# Patient Record
Sex: FEMALE | Race: WHITE | NOT HISPANIC OR LATINO | URBAN - METROPOLITAN AREA
[De-identification: names, ages, dates, MRNs, and addresses within clinical notes are randomized per-mention and may not be internally consistent; named-entity substitution may affect disease eponyms.]

---
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
## 2020-04-01 ENCOUNTER — OFFICE VISIT - RIVER FALLS (OUTPATIENT)
Dept: FAMILY MEDICINE | Facility: CLINIC | Age: 20
End: 2020-04-01

## 2020-04-20 ENCOUNTER — OFFICE VISIT - RIVER FALLS (OUTPATIENT)
Dept: FAMILY MEDICINE | Facility: CLINIC | Age: 20
End: 2020-04-20

## 2020-05-07 ENCOUNTER — OFFICE VISIT - RIVER FALLS (OUTPATIENT)
Dept: FAMILY MEDICINE | Facility: CLINIC | Age: 20
End: 2020-05-07

## 2022-02-12 VITALS
WEIGHT: 156 LBS | HEART RATE: 68 BPM | BODY MASS INDEX: 26.63 KG/M2 | HEIGHT: 64 IN | SYSTOLIC BLOOD PRESSURE: 126 MMHG | DIASTOLIC BLOOD PRESSURE: 84 MMHG

## 2022-02-12 VITALS
WEIGHT: 157.6 LBS | BODY MASS INDEX: 26.91 KG/M2 | DIASTOLIC BLOOD PRESSURE: 84 MMHG | HEART RATE: 88 BPM | HEIGHT: 64 IN | SYSTOLIC BLOOD PRESSURE: 122 MMHG

## 2022-02-12 VITALS
HEIGHT: 64 IN | HEART RATE: 76 BPM | DIASTOLIC BLOOD PRESSURE: 70 MMHG | BODY MASS INDEX: 24.28 KG/M2 | SYSTOLIC BLOOD PRESSURE: 124 MMHG | WEIGHT: 142.2 LBS | TEMPERATURE: 98.6 F

## 2022-02-15 NOTE — NURSING NOTE
Comprehensive Intake Entered On:  5/7/2020 9:42 AM CDT    Performed On:  5/7/2020 9:39 AM CDT by Hallie Nichols LPN               Summary   Chief Complaint :   Verbal consent given for telemed appt. ISHMAEL follow up.    Menstrual Status :   Menarcheal   Hallie Nichols LPN - 5/7/2020 9:39 AM CDT   Health Status   Allergies Verified? :   Yes   Medication History Verified? :   Yes   Hallie Nichols LPN - 5/7/2020 9:39 AM CDT   Consents   Consent for Immunization Exchange :   Consent Granted   Consent for Immunizations to Providers :   Consent Granted   Hallie Nichols LPN - 5/7/2020 9:39 AM CDT   Meds / Allergies   (As Of: 5/7/2020 9:42:41 AM CDT)   Allergies (Active)   No known allergies  Estimated Onset Date:   Unspecified ; Created By:   Shantel Tirado CMA; Reaction Status:   Active ; Category:   Drug ; Substance:   No known allergies ; Type:   Allergy ; Updated By:   Shantel Tirado CMA; Reviewed Date:   4/1/2020 1:41 PM CDT      No Known Medication Allergies  Estimated Onset Date:   Unspecified ; Created By:   Shantel Tirado CMA; Reaction Status:   Active ; Category:   Drug ; Substance:   No Known Medication Allergies ; Type:   Allergy ; Updated By:   Shantel Tirado CMA; Reviewed Date:   4/1/2020 1:41 PM CDT        Medication List   (As Of: 5/7/2020 9:42:41 AM CDT)   Prescription/Discharge Order    ethinyl estradiol-norethindrone  :   ethinyl estradiol-norethindrone ; Status:   Prescribed ; Ordered As Mnemonic:   Ted FE 1.5/30 oral tablet ; Simple Display Line:   1 tab(s), po, daily, 84 tab(s), 3 Refill(s) ; Ordering Provider:   Erich Jaquez MD; Catalog Code:   ethinyl estradiol-norethindrone ; Order Dt/Tm:   4/20/2020 1:37:55 PM CDT          sertraline  :   sertraline ; Status:   Processing ; Ordered As Mnemonic:   sertraline 50 mg oral tablet ; Simple Display Line:   50 mg, 1 tab(s), Oral, daily, 30 tab(s), 5 Refill(s) ; Ordering Provider:   Erich Jaquez MD; Action Display:   Modify ;  Catalog Code:   sertraline ; Order Dt/Tm:   5/7/2020 9:39:59 AM CDT            ID Risk Screen   Recent Travel History :   No recent travel   Family Member Travel History :   No recent travel   Other Exposure to Infectious Disease :   Unknown   Hallie Nichols LPN - 5/7/2020 9:39 AM CDT

## 2022-02-15 NOTE — NURSING NOTE
CAGE Assessment Entered On:  10/18/2019 10:17 AM CDT    Performed On:  10/16/2019 10:11 AM CDT by Tati Allen               Assessment   Have you ever felt you should cut down on your drinking :   No   Have people annoyed you by criticizing your drinking :   No   Have you ever felt bad or guilty about your drinking :   No   Have you ever taken a drink first thing in the morning to steady your nerves or get rid of a hangover (Eye-opener) :   No   CAGE Score :   0    Tati Allen - 10/18/2019 10:11 AM CDT

## 2022-02-15 NOTE — NURSING NOTE
Comprehensive Intake Entered On:  1/24/2019 1:42 PM CST    Performed On:  1/24/2019 1:30 PM CST by Shantel Tirado CMA               Summary   Chief Complaint :   Refill ocp.    Last Menstrual Period :   12/21/2018 CST   Menstrual Status :   Menarcheal   Weight Measured :   156 lb(Converted to: 156 lb 0 oz, 70.76 kg)    Height Measured :   64 in(Converted to: 5 ft 4 in, 162.56 cm)    Body Mass Index :   26.77 kg/m2   Body Surface Area :   1.79 m2   Systolic Blood Pressure :   126 mmHg   Diastolic Blood Pressure :   84 mmHg (HI)    Mean Arterial Pressure :   98 mmHg   Peripheral Pulse Rate :   68 bpm   BP Site :   Right arm   Pulse Site :   Radial artery   BP Method :   Manual   HR Method :   Manual   Shantel Tirado CMA - 1/24/2019 1:30 PM CST   Health Status   Allergies Verified? :   Yes   Medication History Verified? :   Yes   Pre-Visit Planning Status :   Completed   Shantel Tirado CMA - 1/24/2019 1:30 PM CST   Meds / Allergies   (As Of: 1/24/2019 1:42:46 PM CST)   Allergies (Active)   No known allergies  Estimated Onset Date:   Unspecified ; Created By:   Shantel Tirado CMA; Reaction Status:   Active ; Category:   Drug ; Substance:   No known allergies ; Type:   Allergy ; Updated By:   Shantel Tirado CMA; Reviewed Date:   1/24/2019 1:40 PM CST      No Known Medication Allergies  Estimated Onset Date:   Unspecified ; Created By:   Shantel Tirado CMA; Reaction Status:   Active ; Category:   Drug ; Substance:   No Known Medication Allergies ; Type:   Allergy ; Updated By:   Shantel Tirado CMA; Reviewed Date:   12/28/2017 3:31 PM CST        Medication List   (As Of: 1/24/2019 1:42:46 PM CST)   Prescription/Discharge Order    ethinyl estradiol-norethindrone  :   ethinyl estradiol-norethindrone ; Status:   Prescribed ; Ordered As Mnemonic:   Ted FE 1.5/30 oral tablet ; Simple Display Line:   1 tab(s), po, daily, for 28 day(s), 84 tab(s), 4 Refill(s) ; Ordering Provider:   Ariana Mistry MD; Catalog Code:    ethinyl estradiol-norethindrone ; Order Dt/Tm:   12/28/2017 3:42:24 PM

## 2022-02-15 NOTE — NURSING NOTE
Comprehensive Intake Entered On:  4/20/2020 1:24 PM CDT    Performed On:  4/20/2020 1:21 PM CDT by Nneka Dahl MA               Summary   Chief Complaint :   verbal consent given for telemed visit.  discuss resuming BCP, has been off for a few months.   Menstrual Status :   Menarcheal   Nneka Dahl MA - 4/20/2020 1:21 PM CDT   Health Status   Allergies Verified? :   Yes   Medication History Verified? :   Yes   Medical History Verified? :   Yes   Pre-Visit Planning Status :   Not completed   Tobacco Use? :   Never smoker   Nneka Dahl MA - 4/20/2020 1:21 PM CDT   Consents   Consent for Immunization Exchange :   Consent Granted   Consent for Immunizations to Providers :   Consent Granted   Nneka Dahl MA - 4/20/2020 1:21 PM CDT   Meds / Allergies   (As Of: 4/20/2020 1:24:36 PM CDT)   Allergies (Active)   No known allergies  Estimated Onset Date:   Unspecified ; Created By:   Shantel Tirado CMA; Reaction Status:   Active ; Category:   Drug ; Substance:   No known allergies ; Type:   Allergy ; Updated By:   Shantel Tirado CMA; Reviewed Date:   4/1/2020 1:41 PM CDT      No Known Medication Allergies  Estimated Onset Date:   Unspecified ; Created By:   Shantel Tirado CMA; Reaction Status:   Active ; Category:   Drug ; Substance:   No Known Medication Allergies ; Type:   Allergy ; Updated By:   Shantel Tirado CMA; Reviewed Date:   4/1/2020 1:41 PM CDT        Medication List   (As Of: 4/20/2020 1:24:36 PM CDT)   Prescription/Discharge Order    sertraline  :   sertraline ; Status:   Prescribed ; Ordered As Mnemonic:   sertraline 50 mg oral tablet ; Simple Display Line:   50 mg, 1 tab(s), Oral, daily, Take 1/2 tab daily for the first 6 days then increase to 1 tab, 30 tab(s), 5 Refill(s) ; Ordering Provider:   Erich Jaquez MD; Catalog Code:   sertraline ; Order Dt/Tm:   4/1/2020 1:52:42 PM CDT            Social History   Social History   (As Of: 4/20/2020 1:24:36 PM CDT)   Tobacco:        Household  tobacco concerns: Yes.   (Last Updated: 1/3/2018 9:03:22 AM CST by Aida Jama)          Home/Environment:        Lives with Mother.  Risks in environment: Gun in the home..   (Last Updated: 11/4/2016 1:24:25 PM CDT by Soraya Lowry)

## 2022-02-15 NOTE — NURSING NOTE
Generalized Anxiety Disorder Screening Entered On:  5/7/2020 9:43 AM CDT    Performed On:  5/7/2020 9:42 AM CDT by Hallie Nichols LPN               Generalized Anxiety Disorder Screening   ISHMAEL Nervous, Anxious On Edge :   Several days   ISHMAEL Control Worrying B :   Several days   ISHMAEL Worrying Too Much :   Several days   ISHMAEL Trouble Relaxing :   More than half the days   ISHMAEL Restless :   More than half the days   ISHMAEL Easily Annoyed/Irritable :   Several days   ISHMAEL Afraid :   Several days   ISHMAEL Total Screening Score :   9    ISHMAEL Difficulty with Work, Home, Others :   Somewhat difficult   Hallie Nichols LPN - 5/7/2020 9:42 AM CDT

## 2022-02-15 NOTE — PROGRESS NOTES
Chief Complaint    verbal consent given for telemed visit.  discuss resuming BCP, has been off for a few months.   Today's visit was conducted via telephone due to the COVID-19 pandemic.  Patient's consent to telephone visit was obtained and documented.   Call Start Time: 1335   Call End Time:   1340  History of Present Illness      Patient would like to resume oral contraceptives.  She has been on ethinyl estradiol norethindrone in the past without any troubles.  Her last menstrual period was last week.  She is not currently sexually active.  Her blood pressure has been normal in the past.  Review of Systems      See HPI.  All other review of systems negative.  Assessment/Plan       1. Contraceptive management (Z30.41)        Resume oral birth control.  3-month supply is been sent in.  Discussed starting of the medication.  Also discussed use of condoms for prevention of sexually transmitted infection        Orders:         ethinyl estradiol-norethindrone, 1 tab(s), po, daily, # 84 tab(s), 3 Refill(s), Type: Maintenance, Pharmacy: [facility] #[id number], 1 tab(s) Oral daily, (Ordered)  Patient Information     Name:SUSSY MORENO      Address:      [address] [place] [id number]     Sex:Female     YOB: 2000     [phone]     Emergency Contact:YAYO CASTELLANO     MRN:[id number]     FIN:[id number]     Location:[facility]     Date of Service:04/20/2020      Primary Care Physician:       Erich Jaquez MD, [phone]      Attending Physician:       Erich Jaquez MD, [phone]  Problem List/Past Medical History    Ongoing     Anxiety    Historical     No qualifying data  Medications    Ted FE 1.5/30 oral tablet, 1 tab(s), Oral, daily, 3 refills    sertraline 50 mg oral tablet, 50 mg= 1 tab(s), Oral, daily, 5 refills  Allergies    No Known Medication Allergies    No known allergies  Social History    Smoking Status - 04/20/2020     Never smoker      Home/Environment      Lives with Mother. Risks in environment: Gun in the home.., 11/04/2016     Tobacco      Household tobacco concerns: Yes., 01/03/2018  Family History    Arthritis: Grandparent.    Diabetes mellitus: Grandparent.    Hypertension: Grandfather (M).    Tobacco user: Father.    Mother: History is negative  Immunizations      Vaccine Date Status          varicella 03/27/2012 Recorded          meningococcal conjugate vaccine 03/27/2012 Recorded          tetanus/diphth/pertuss (Tdap) adult/adol 03/27/2012 Recorded          human papillomavirus vaccine 03/27/2012 Recorded          DTaP 09/07/2005 Recorded          tetanus/diphth/pertuss (Tdap) adult/adol 09/07/2005 Recorded          MMR (measles/mumps/rubella) 09/07/2005 Recorded          IPV 09/07/2005 Recorded          varicella 09/21/2001 Recorded          DTaP 09/21/2001 Recorded          pneumococcal (PCV7) 09/21/2001 Recorded          IPV 09/21/2001 Recorded          varicella 06/22/2001 Recorded          MMR (measles/mumps/rubella) 06/22/2001 Recorded          Hib (HbOC) 06/22/2001 Recorded          Hib (HbOC) 03/27/2001 Recorded          DTaP 2000 Recorded          hepatitis B pediatric vaccine 2000 Recorded          Hib (HbOC) 2000 Recorded          DTaP 2000 Recorded          hepatitis B pediatric vaccine 2000 Recorded          IPV 2000 Recorded          Hib (HbOC) 2000 Recorded          DTaP 2000 Recorded          hepatitis B pediatric vaccine 2000 Recorded          IPV 2000 Recorded          Hib (HbOC) 2000 Recorded

## 2022-02-15 NOTE — PROGRESS NOTES
Chief Complaint    Verbal consent given for telemed appt. ISHMAEL follow up.   Today's visit was conducted via telephone due to the COVID-19 pandemic.  Patient's consent to telephone visit was obtained and documented.   Call Start Time: 1001   Call End Time:   1007  History of Present Illness      Patient is a follow-up for generalized anxiety disorder.  She has been on sertraline 50 mg daily for the last month.  She has been doing well.  Denies any side effects from medication.  She continues with her online schooling and is recently started working at [employer].  Overall has been doing well and is satisfied with the medication.  Review of Systems      See HPI.  All other review of systems negative.  Assessment/Plan       Anxiety (F41.1)        Generalized anxiety disorder is under control.  We will continue with her current medication.  Advise follow-up in mid September.          Ordered:          sertraline, = 1 tab(s) ( 50 mg ), Oral, daily, # 30 tab(s), 5 Refill(s), Type: Maintenance, Pharmacy: [facility] #[id number], (Ordered)           Patient Information     Name:SUSSY MORENO      Address:      [address] [place] [id number]     Sex:Female     YOB: 2000     [phone]     Emergency Contact:YAYO CASTELLANO     MRN:[id number]     FIN:[id number]     Location:[facility]     Date of Service:05/07/2020      Primary Care Physician:       Erich Jaquez MD, [phone]      Attending Physician:       Erich Jaquez MD, [phone]  Problem List/Past Medical History    Ongoing     Anxiety    Historical     No qualifying data  Medications    Ted FE 1.5/30 oral tablet, 1 tab(s), Oral, daily, 3 refills    sertraline 50 mg oral tablet, 50 mg= 1 tab(s), Oral, daily, 5 refills  Allergies    No Known Medication Allergies    No known allergies  Social History    Smoking Status - 04/20/2020     Never smoker     Home/Environment      Lives with Mother. Risks  in environment: Gun in the home.., 11/04/2016     Tobacco      Household tobacco concerns: Yes., 01/03/2018  Family History    Arthritis: Grandparent.    Diabetes mellitus: Grandparent.    Hypertension: Grandfather (M).    Tobacco user: Father.    Mother: History is negative  Immunizations      Vaccine Date Status          varicella 03/27/2012 Recorded          meningococcal conjugate vaccine 03/27/2012 Recorded          tetanus/diphth/pertuss (Tdap) adult/adol 03/27/2012 Recorded          human papillomavirus vaccine 03/27/2012 Recorded          DTaP 09/07/2005 Recorded          tetanus/diphth/pertuss (Tdap) adult/adol 09/07/2005 Recorded          MMR (measles/mumps/rubella) 09/07/2005 Recorded          IPV 09/07/2005 Recorded          varicella 09/21/2001 Recorded          DTaP 09/21/2001 Recorded          pneumococcal (PCV7) 09/21/2001 Recorded          IPV 09/21/2001 Recorded          varicella 06/22/2001 Recorded          MMR (measles/mumps/rubella) 06/22/2001 Recorded          Hib (HbOC) 06/22/2001 Recorded          Hib (HbOC) 03/27/2001 Recorded          DTaP 2000 Recorded          hepatitis B pediatric vaccine 2000 Recorded          Hib (HbOC) 2000 Recorded          DTaP 2000 Recorded          hepatitis B pediatric vaccine 2000 Recorded          IPV 2000 Recorded          Hib (HbOC) 2000 Recorded          DTaP 2000 Recorded          hepatitis B pediatric vaccine 2000 Recorded          IPV 2000 Recorded          Hib (HbOC) 2000 Recorded

## 2022-02-15 NOTE — PROGRESS NOTES
Patient:   SUSSY CACERES            MRN: [id number]            FIN: [id number]               Age:   17 years     Sex:  Female     :  2000   Associated Diagnoses:   Contraceptive management; Well child examination   Author:   Ariana Mistry MD      Chief Complaint   2017 3:03 PM CST   17 year old well exam , refill BC      Well Child History   Here today with mom for 17 year well-child exam as well as birth control refill.  Overall no concerns.  Development: Is in 12th grade at [employer].  Plans to join the [employer] when she graduates.  Unsure what she wants to do long-term.  Grades are A s and B s.  Does well socially.  Has been sexually active in the past but not currently.  Did have a chlamydia test through her  physical.  Driving a car.  Denies using her phone while driving.  Does wear her seatbelt.  Does not wear a helmet when she rides a 4 aguayo.  Denies tobacco alcohol and drug use.  Diet: Eats a wide variety of foods.  Does eat breakfast daily.  No concerns about weight.  Sleep: Bed around 8:30 at night.  Falls asleep easily and able to stay asleep.  Does keep her phone in her room for her alarm clock.  Last menstrual period was .  Periods are only 3-4 days on the oral contraceptives.  Thinks the birth control is regulating things well and has no concerns but would like refills.      Review of Systems   Constitutional:  Negative.    Eye:  Negative.    Ear/Nose/Mouth/Throat:  Negative.    Respiratory:  Negative.    Cardiovascular:  Negative.    Gastrointestinal:  Negative.    Genitourinary:  Negative.    Musculoskeletal:  Negative.    Integumentary:  Negative.       Health Status   Allergies:    Allergic Reactions (Selected)  No Known Medication Allergies   Medications:  (Selected)   Prescriptions  Prescribed  Ted FE 1.5/30 oral tablet: 1 tab(s), po, daily, # 28 tab(s), 0 Refill(s), Type: Maintenance, Pharmacy: [facility] #[id number]      Histories   Past Medical  History:    No active or resolved past medical history items have been selected or recorded.   Family History:    Diabetes mellitus  Grandparent  Arthritis  Grandparent     Procedure history:    No active procedure history items have been selected or recorded.   Social History:        Home and Environment Assessment            Lives with Mother.  Risks in environment: Gun in the home..        Physical Examination   Vital Signs   12/28/2017 3:03 PM CST Temperature Tympanic 98.6 DegF    Peripheral Pulse Rate 76 bpm    Pulse Site Radial artery    HR Method Manual    Systolic Blood Pressure 124 mmHg    Diastolic Blood Pressure 70 mmHg    Mean Arterial Pressure 88 mmHg    BP Site Right arm    BP Method Manual      Measurements from flowsheet : Measurements   12/28/2017 3:03 PM CST Height Measured - Metric 162.5 cm    Weight Measured - Metric 64.5 kg    BSA - Metric 1.71 m2    Body Mass Index - Metric 24.43 kg/m2    Body Mass Index Percentile 79.52      General:  Alert and oriented, No acute distress.    Eye:  Pupils are equal, round and reactive to light, Extraocular movements are intact, Undilated funduscopic exam:  Vessels smooth, disc margins not visualized. .    HENT:  Tympanic membranes are clear, Oral mucosa is moist, No pharyngeal erythema.    Neck:  No lymphadenopathy, No thyromegaly.    Respiratory:  Lungs clear to auscultation bilaterally.  Equal air entry.  Symmetrical chest expansion.  No wheezing.  .    Cardiovascular:  S1 and S2 with regular rate and rhythm.  No murmurs. Brisk capillary refill.  .    Gastrointestinal:  Positive bowel sounds in all four quadrants.  Abdomen is soft, non-distended, non-tender.  No hepatosplenomegaly.  .    Genitourinary:  Declines  exam.    Musculoskeletal:  Normal gait.    Integumentary:  Refuses a gown, unable to fully assess.    Neurologic:  No focal deficits, Normal deep tendon reflexes.    Psychiatric:  Cooperative, Appropriate mood & affect.       Impression and Plan    Diagnosis     Contraceptive management ([id number] Z30.41).     Well child examination (CKI13-LF Z00.129).     Plan:  Reinforced importance of helmet use when riding a 4 aguayo in particular.  Discussed condom use and abstinence.  Declines Chlamydia screening as she believes she ready had this done.  Declines Menactra and HPV today.  Encouraged them to ensure they had the Menactra prior to entering the .  Return to clinic in 1 year for follow-up on birth control, sooner if needed.  .    Orders     Orders (Selected)   Prescriptions  Prescribed  Ted FE 1.5/30 oral tablet: 1 tab(s), po, daily, # 84 tab(s), 4 Refill(s), Type: Maintenance, Pharmacy: [facility] #[id number], 1 tab(s) po daily,x28 day(s).

## 2022-02-15 NOTE — TELEPHONE ENCOUNTER
Entered by Cathryn Kirkland CMA on January 22, 2019 10:24:35 AM CST  ---------------------  From: Cathryn Kirkland CMA   To: [facility] #[id number]    Sent: 1/22/2019 10:24:35 AM CST  Subject: Medication Management     ** Not Approved: Refill not appropriate, will refill at upcoming appt - pt has enough to last until then. **  ethinyl estradiol-norethindrone (Ted Fe 1.5/30 Oral Tablet 1.5-30 MG-MCG)  TAKE ONE TABLET BY MOUTH ONE TIME DAILY  Qty:  84 tab(s)        Days Supply:  84        Refills:  3          MARK     Route To Pharmacy - [facility] #[id number]   Signed by Cathryn Kirkland CMA            ------------------------------------------  From: [facility] #[id number]  To: Ariana Mistry MD  Sent: January 21, 2019 5:02:41 PM CST  Subject: Medication Management  Due: January 22, 2019 5:02:41 PM CST    ** On Hold Pending Signature **  Drug: ethinyl estradiol-norethindrone (Ted FE 1.5/30 oral tablet)  TAKE ONE TABLET BY MOUTH ONE TIME DAILY   Quantity: 84 tab(s)     Days Supply: 84        Refills: 3  Substitutions Allowed  Notes from Pharmacy:     Dispensed Drug: ethinyl estradiol-norethindrone (Ted FE 1.5/30 oral tablet)  TAKE ONE TABLET BY MOUTH ONE TIME DAILY   Quantity: 84 tab(s)     Days Supply: 84        Refills: 3  Substitutions Allowed  Notes from Pharmacy:   ------------------------------------------

## 2022-02-15 NOTE — TELEPHONE ENCOUNTER
Entered by Cathryn Kirkland CMA on January 06, 2021 7:19:31 AM CST  ---------------------  From: Cathryn Kirkland CMA   To: [facility] #[id number]    Sent: 1/6/2021 7:19:31 AM CST  Subject: Medication Management     ** Submitted: **  Order:sertraline (sertraline 50 mg oral tablet)  1 tab(s)  Oral  daily  Qty:  30 tab(s)        Refills:  0          Substitutions Allowed     Route To [facility] #[id number]    Signed by Cathryn Kirkland CMA  1/6/2021 1:19:00 PM [facility]    ** Submitted: **  Complete:sertraline (sertraline 50 mg oral tablet)   Signed by Cathryn Kirkland CMA  1/6/2021 1:19:00 PM [facility]    ** Not Approved:  **  sertraline (SERTRALINE 50MG TABLETS)  TAKE ONE-HALF TABLET BY MOUTH DAILY FOR THE FIRST 6 DAYS THEN INCREASE TO 1 TABLET BY MOUTH DAILY  Qty:  30 tab(s)        Days Supply:  30        Refills:  0          Substitutions Allowed     Route To [facility] #[id number]   Signed by Cathryn Kirkland CMA            ------------------------------------------  From: [facility] #[id number]  To: Erich Jaquez MD  Sent: January 5, 2021 12:59:14 PM CST  Subject: Medication Management  Due: January 5, 2021 5:06:09 PM CST     ** On Hold Pending Signature **     Dispensed Drug: sertraline (sertraline 50 mg oral tablet), TAKE ONE-HALF TABLET BY MOUTH DAILY FOR THE FIRST 6 DAYS THEN INCREASE TO 1 TABLET BY MOUTH DAILY  Quantity: 30 tab(s)  Days Supply: 30  Refills: 0  Substitutions Allowed  Notes from Pharmacy:  ------------------------------------------5/7/20 ISHMAEL f/u:  Assessment/Plan Anxiety (F41.1) Generalized anxiety disorder is under control. We will continue with her current medication. Advise follow-up in mid September.

## 2022-02-15 NOTE — PROGRESS NOTES
Patient:   SUSSY MORENO            MRN: [id number]            FIN: [id number]               Age:   19 years     Sex:  Female     :  2000   Associated Diagnoses:   Anxiety   Author:   Dante Booker MD      Visit Information      Date of Service: 10/16/2019 03:40 pm  Performing Location: [facility]  Encounter#: [id number]      Primary Care Provider (PCP):  Zunilda Cho    NPI# [id number]      Chief Complaint   10/16/2019 3:47 PM CDT   Pt here today for anxiety.        History of Present Illness   Patient is here desiring help with anxiety.  Sinus struggle for a few years with her but is a lot worse over the last few months.  Her  is in the [employer] finishing basic training there may be moving in 2 months to [place].  He will then be deployed at some point in the next year.  She knows she is having trouble sleeping feels anxious nervous has a hard time getting things done would like to seek some counseling when she gets to [place] but would like a medication in the meantime.  Denies any significant depressive features         Review of Systems   Constitutional:  Negative.    Eye:  Negative.    Ear/Nose/Mouth/Throat:  Negative.    Respiratory:  Negative.    Cardiovascular:  Negative.    Gastrointestinal:  Negative.    Genitourinary:  Negative.    Hematology/Lymphatics:  Negative.    Endocrine:  Negative.    Immunologic:  Negative.    Musculoskeletal:  Negative.    Integumentary:  Negative.    Neurologic:  Negative.    Psychiatric:  Negative except as documented in history of present illness.       Health Status   Allergies:    Allergic Reactions (Selected)  No known allergies  No Known Medication Allergies   Medications:  (Selected)   Prescriptions  Prescribed  Ted FE 1.5/30 oral tablet: 1 tab(s), po, daily, # 84 tab(s), 3 Refill(s), Type: Maintenance, Pharmacy: [id number], 1 tab(s) Oral daily  Lexapro 5 mg oral tablet: = 1 tab(s) ( 5 mg ), Oral, daily, # 30  tab(s), 1 Refill(s), Type: Maintenance, Pharmacy: [id number], 1 tab(s) Oral daily      Histories   Past Medical History:    No active or resolved past medical history items have been selected or recorded.   Family History:    Diabetes mellitus  Grandparent  Hypertension  Grandfather (M)  Arthritis  Grandparent  Tobacco user  Father     Procedure history:    No active procedure history items have been selected or recorded.   Social History:        Tobacco Assessment            Household tobacco concerns: Yes.      Home and Environment Assessment            Lives with Mother.  Risks in environment: Gun in the home..        Physical Examination   Vital Signs   10/16/2019 3:47 PM CDT Peripheral Pulse Rate 88 bpm    HR Method Electronic    Systolic Blood Pressure 122 mmHg    Diastolic Blood Pressure 84 mmHg  HI    Mean Arterial Pressure 97 mmHg    BP Method Manual      Measurements from flowsheet : Measurements   10/16/2019 3:47 PM CDT Height Measured - Standard 64 in    Weight Measured - Standard 157.6 lb    BSA 1.79 m2    Body Mass Index 27.05 kg/m2    Body Mass Index Percentile 87.51      General:  Alert and oriented, No acute distress.       Impression and Plan   Diagnosis     Anxiety (XNC13-FH F41.9).     Course:  Not progressing as expected.    Plan:   15 min spent Face-to-face     We will start her with Lexapro she we will see us back in 4 weeks.  .    Patient Instructions:       Counseled: Patient, Regarding diagnosis, Regarding treatment, Regarding medications.

## 2022-02-15 NOTE — PROGRESS NOTES
Patient:   SUSSY CACERES            MRN: [id number]            FIN: [id number]               Age:   18 years     Sex:  Female     :  2000   Associated Diagnoses:   Contraceptive management; Well woman exam   Author:   Zunilda Cho      Visit Information      Date of Service: 2019 01:37 pm  Performing Location: [facility]  Encounter#: [id number]      Primary Care Provider (PCP):  Zunilda Cho    NPI# [id number]      Chief Complaint   2019 1:30 PM CST    Refill ocp.        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 Uses COCs to help with period regulation. No concerns      Review of Systems   Constitutional:  Negative.    Eye:  Negative.    Ear/Nose/Mouth/Throat:  Negative.    Respiratory:  Negative.    Cardiovascular:  Negative.    Breast:  Negative.    Gastrointestinal:  Negative.    Genitourinary:  Negative.    Gynecologic:  Negative.    Hematology/Lymphatics:  Negative.    Endocrine:  Negative.    Immunologic:  Negative.    Musculoskeletal:  Negative.    Integumentary:  Negative.    Neurologic:  Negative.    Psychiatric:  Negative.       Health Status   Allergies:    Allergic Reactions (Selected)  No known allergies  No Known Medication Allergies   Medications:  (Selected)   Prescriptions  Prescribed  Ted FE . oral tablet: 1 tab(s), po, daily, # 84 tab(s), 3 Refill(s), Type: Maintenance, Pharmacy: [id number], 1 tab(s) Oral daily      Histories   Family History:    Diabetes mellitus  Grandparent  Hypertension  Grandfather (M)  Arthritis  Grandparent  Tobacco user  Father     Procedure history:    No active procedure history items have been selected or recorded.   Social History:        Tobacco Assessment            Household  tobacco concerns: Yes.      Home and Environment Assessment            Lives with Mother.  Risks in environment: Gun in the home..      Physical Examination   Last Menstrual Period: 12/21/2018     Vital Signs   1/24/2019 1:30 PM CST Peripheral Pulse Rate 68 bpm    Pulse Site Radial artery    HR Method Manual    Systolic Blood Pressure 126 mmHg    Diastolic Blood Pressure 84 mmHg  HI    Mean Arterial Pressure 98 mmHg    BP Site Right arm    BP Method Manual      Measurements from flowsheet : Measurements   1/24/2019 1:30 PM CST Height Measured - Standard 64 in    Weight Measured - Standard 156 lb    BSA 1.79 m2    Body Mass Index 26.77 kg/m2    Body Mass Index Percentile 87.55      General:  Alert and oriented, No acute distress.    Eye:  Pupils are equal, round and reactive to light, Intact accommodation, Normal conjunctiva, Vision unchanged.         Periorbital area: Within normal limits.    HENT:  Normocephalic, Tympanic membranes are clear, Normal hearing, Oral mucosa is moist, No pharyngeal erythema, No sinus tenderness.    Neck:  Supple, Non-tender, No lymphadenopathy, No thyromegaly.    Respiratory:  Lungs are clear to auscultation, Respirations are non-labored, No chest wall tenderness.    Cardiovascular:  Normal rate, Regular rhythm, No murmur, No edema.    Gastrointestinal:  Soft, Non-tender, Non-distended, Normal bowel sounds, No organomegaly.    Genitourinary:  No costovertebral angle tenderness.    Lymphatics:  No lymphadenopathy neck, axilla, groin.    Musculoskeletal:  Normal range of motion, Normal strength, No swelling.    Integumentary:  Warm, Dry, Pink.    Neurologic:  Alert, Oriented.    Psychiatric:  Cooperative, Appropriate mood & affect.       Impression and Plan   Diagnosis     Contraceptive management (OSR30-EL Z30.9).     Well woman exam (EVK56-EH Z01.419).     Patient Instructions:       Counseled: Patient, Regarding medications, Verbalized understanding.    Summary:  refilled  COCs  discussed good nutrition, discussed stress management  discussed exercise.    Orders     Orders (Selected)   Prescriptions  Prescribed  Ted FE 1.5/30 oral tablet: 1 tab(s), po, daily, # 84 tab(s), 3 Refill(s), Type: Maintenance, Pharmacy: [id number], 1 tab(s) Oral daily.

## 2022-02-15 NOTE — NURSING NOTE
Comprehensive Intake Entered On:  10/16/2019 3:49 PM CDT    Performed On:  10/16/2019 3:47 PM CDT by Viky Hirsch               Summary   Chief Complaint :   Pt here today for anxiety.   Menstrual Status :   Menarcheal   Weight Measured :   157.6 lb(Converted to: 157 lb 10 oz, 71.49 kg)    Height Measured :   64 in(Converted to: 5 ft 4 in, 162.56 cm)    Body Mass Index :   27.05 kg/m2   Body Surface Area :   1.79 m2   Systolic Blood Pressure :   122 mmHg   Diastolic Blood Pressure :   84 mmHg (HI)    Mean Arterial Pressure :   97 mmHg   Peripheral Pulse Rate :   88 bpm   BP Method :   Manual   HR Method :   Electronic   Viky Hirsch - 10/16/2019 3:47 PM CDT   Health Status   Allergies Verified? :   Yes   Medication History Verified? :   Yes   Medical History Verified? :   Yes   Pre-Visit Planning Status :   Completed   Viky Hirsch - 10/16/2019 3:47 PM CDT   Consents   Consent for Immunization Exchange :   Consent Granted   Consent for Immunizations to Providers :   Consent Granted   Viky Hirsch - 10/16/2019 3:47 PM CDT   Meds / Allergies   (As Of: 10/16/2019 3:49:05 PM CDT)   Allergies (Active)   No known allergies  Estimated Onset Date:   Unspecified ; Created By:   Shantel Tirado CMA; Reaction Status:   Active ; Category:   Drug ; Substance:   No known allergies ; Type:   Allergy ; Updated By:   Shantel Tirado CMA; Reviewed Date:   10/16/2019 3:48 PM CDT      No Known Medication Allergies  Estimated Onset Date:   Unspecified ; Created By:   Shantel Tirado CMA; Reaction Status:   Active ; Category:   Drug ; Substance:   No Known Medication Allergies ; Type:   Allergy ; Updated By:   Shantel Tirado CMA; Reviewed Date:   10/16/2019 3:48 PM CDT        Medication List   (As Of: 10/16/2019 3:49:05 PM CDT)   Prescription/Discharge Order    ethinyl estradiol-norethindrone  :   ethinyl estradiol-norethindrone ; Status:   Prescribed ; Ordered As Mnemonic:   Ted  FE 1.5/30 oral tablet ; Simple Display Line:   1 tab(s), po, daily, 84 tab(s), 3 Refill(s) ; Ordering Provider:   Zunilda Cho; Catalog Code:   ethinyl estradiol-norethindrone ; Order Dt/Tm:   1/24/2019 1:53:43 PM CST

## 2022-02-15 NOTE — NURSING NOTE
Comprehensive Intake Entered On:  4/1/2020 1:42 PM CDT    Performed On:  4/1/2020 1:37 PM CDT by Hallie Nichols LPN               Summary   Chief Complaint :   verbal consent for telemed visit... anxiety concerns has been on lexapo in the past, was not effective.    Menstrual Status :   Menarcheal   Hallie Nichols LPN - 4/1/2020 1:37 PM CDT   Health Status   Allergies Verified? :   Yes   Medication History Verified? :   Yes   Hallie Nichols LPN - 4/1/2020 1:37 PM CDT   Consents   Consent for Immunization Exchange :   Consent Granted   Consent for Immunizations to Providers :   Consent Granted   Hallie Nichols LPN - 4/1/2020 1:37 PM CDT   Meds / Allergies   (As Of: 4/1/2020 1:42:40 PM CDT)   Allergies (Active)   No known allergies  Estimated Onset Date:   Unspecified ; Created By:   Shantel Tirado CMA; Reaction Status:   Active ; Category:   Drug ; Substance:   No known allergies ; Type:   Allergy ; Updated By:   Shantel Tirado CMA; Reviewed Date:   4/1/2020 1:41 PM CDT      No Known Medication Allergies  Estimated Onset Date:   Unspecified ; Created By:   Shantel Tirado CMA; Reaction Status:   Active ; Category:   Drug ; Substance:   No Known Medication Allergies ; Type:   Allergy ; Updated By:   Shantel Tirado CMA; Reviewed Date:   4/1/2020 1:41 PM CDT        Medication List   (As Of: 4/1/2020 1:42:40 PM CDT)   Prescription/Discharge Order    escitalopram  :   escitalopram ; Status:   Completed ; Ordered As Mnemonic:   Lexapro 5 mg oral tablet ; Simple Display Line:   5 mg, 1 tab(s), Oral, daily, 30 tab(s), 1 Refill(s) ; Ordering Provider:   Dante Booker MD; Catalog Code:   escitalopram ; Order Dt/Tm:   10/16/2019 3:55:38 PM CDT          ethinyl estradiol-norethindrone  :   ethinyl estradiol-norethindrone ; Status:   Completed ; Ordered As Mnemonic:   Ted FE 1.5/30 oral tablet ; Simple Display Line:   1 tab(s), po, daily, 84 tab(s), 3 Refill(s) ; Ordering Provider:   Maryuri MERINO,  Zunilda; Catalog Code:   ethinyl estradiol-norethindrone ; Order Dt/Tm:   1/24/2019 1:53:43 PM CST

## 2022-02-15 NOTE — LETTER
(Inserted Image. Unable to display)     January 24, 2020      SUSSY MORENO  [address]  [place], [place] [id number]          Dear SUSSY,      Thank you for selecting [facility] ([facility] [place] [place] [facility]) for your healthcare needs.      Our records indicate you are due for the following services:     Annual Physical and Gynecologic Exam ~ Yearly wellness exams are important for your ongoing health and wellness.  This exam gives you the opportunity to meet with your health care provider to review your health, update immunizations and to recommend preventive screenings that you may be due for.  At your wellness visit, your Healthcare Provider will determine the need for a gynecologic exam and/or Pap smear.      To schedule an appointment or if you have further questions, please contact your primary clinic:   [facility]       [phone]   [facility]       [phone]              [facility]     [phone]      Powered by [employer] and [employer]    Sincerely,    Dante Booker MD   Patient presented after waiting 30 minutes with normal reaction to allergy injections. Discharged from clinic.    Renee Gary RN ............   11/20/2018...3:11 PM

## 2022-02-15 NOTE — PROGRESS NOTES
Chief Complaint    verbal consent for telemed visit... anxiety concerns has been on lexapo in the past, was not effective.   Today's visit was conducted via telephone due to the COVID-19 pandemic.  Patient's consent to telephone visit was obtained and documented.   Call Start Time: 1344   Call End Time:   1355  History of Present Illness      Patient is recurrence of anxiety disorder.  She was previously treated with escitalopram in October of last year.  She took the medication for about 1 month and stopped citing no significant improvement in her anxiety symptoms.  She reports is hard to focus.  She has excessive worry.  She has trouble sleeping.  Denies any significant panic attacks.  She also cries very easily.  Denies suicidal ideation.  ISHMAEL 7 score is 10  Review of Systems      See HPI.  All other review of systems negative.  Assessment/Plan       Anxiety (F41.1)        Generalized anxiety disorder worsening        Sertraline 50 mg daily, discussed side effects of medication and expected course of improvement.  Follow-up visit in 1 month or sooner as needed.          Ordered:          sertraline, = 1 tab(s) ( 50 mg ), Oral, daily, Instructions: Take 1/2 tab daily for the first 6 days then increase to 1 tab, # 30 tab(s), 5 Refill(s), Type: Maintenance, Pharmacy: [id number], 1 tab(s) Oral daily,Instr:Take 1/2 tab daily for the f..., (Ordered)                Orders:         Return to Clinic (Request), RFV: Follow up anxiety, new medication, Return in 1 month  Patient Information     Name:SUSSY MORENO      Address:      [address] [place] [id number]     Sex:Female     YOB: 2000     [phone]     Emergency Contact:YAYO CASTELLANO     MRN:[id number]     FIN:[id number]     Location:[facility]     Date of Service:04/01/2020      Primary Care Physician:       Erich Jaquez MD, [phone]      Attending Physician:       Gisele JEAN BAPTISTE,  [facility], [phone]  Problem List/Past Medical History    Ongoing     Anxiety    Historical     No qualifying data  Medications    sertraline 50 mg oral tablet, 50 mg= 1 tab(s), Oral, daily, 5 refills  Allergies    No Known Medication Allergies    No known allergies  Social History    Smoking Status - 12/28/2017     Never smoker     Home/Environment      Lives with Mother. Risks in environment: Gun in the home.., 11/04/2016     Tobacco      Household tobacco concerns: Yes., 01/03/2018  Family History    Arthritis: Grandparent.    Diabetes mellitus: Grandparent.    Hypertension: Grandfather (M).    Tobacco user: Father.    Mother: History is negative  Immunizations      Vaccine Date Status          varicella 03/27/2012 Recorded          meningococcal conjugate vaccine 03/27/2012 Recorded          tetanus/diphth/pertuss (Tdap) adult/adol 03/27/2012 Recorded          human papillomavirus vaccine 03/27/2012 Recorded          DTaP 09/07/2005 Recorded          tetanus/diphth/pertuss (Tdap) adult/adol 09/07/2005 Recorded          MMR (measles/mumps/rubella) 09/07/2005 Recorded          IPV 09/07/2005 Recorded          varicella 09/21/2001 Recorded          DTaP 09/21/2001 Recorded          pneumococcal (PCV7) 09/21/2001 Recorded          IPV 09/21/2001 Recorded          varicella 06/22/2001 Recorded          MMR (measles/mumps/rubella) 06/22/2001 Recorded          Hib (HbOC) 06/22/2001 Recorded          Hib (HbOC) 03/27/2001 Recorded          DTaP 2000 Recorded          hepatitis B pediatric vaccine 2000 Recorded          Hib (HbOC) 2000 Recorded          DTaP 2000 Recorded          hepatitis B pediatric vaccine 2000 Recorded          IPV 2000 Recorded          Hib (HbOC) 2000 Recorded          DTaP 2000 Recorded          hepatitis B pediatric vaccine 2000 Recorded          IPV 2000 Recorded          Hib (HbOC) 2000 Recorded

## 2022-02-15 NOTE — NURSING NOTE
Depression Screening Entered On:  10/18/2019 10:18 AM CDT    Performed On:  10/16/2019 10:11 AM CDT by Tati Allen               Depression Screening   Little Interest - Pleasure in Activities :   Several days   Feeling Down, Depressed, Hopeless :   Several days   Initial Depression Screen Score :   2    Trouble Falling or Staying Asleep :   Nearly every day   Feeling Tired or Little Energy :   Nearly every day   Poor Appetite or Overeating :   Nearly every day   Feeling Bad About Yourself :   Nearly every day   Trouble Concentrating :   Not at all   Moving or Speaking Slowly :   Not at all   Thoughts Better Off Dead or Hurting Self :   Not at all   Detailed Depression Screen Score :   12    Total Depression Screen Score :   14    ISHMAEL Difficulty with Work, Home, Others :   Somewhat difficult   Tati Allen - 10/18/2019 10:11 AM CDT

## 2022-02-15 NOTE — NURSING NOTE
Generalized Anxiety Disorder Screening Entered On:  10/18/2019 10:18 AM CDT    Performed On:  10/16/2019 10:11 AM CDT by Tati Allen               Generalized Anxiety Disorder Screening   ISHMAEL Nervous, Anxious On Edge :   More than half the days   ISHMAEL Control Worrying B :   More than half the days   ISHMAEL Worrying Too Much :   More than half the days   ISHMAEL Restless :   More than half the days   ISHMAEL Easily Annoyed/Irritable :   Nearly every day   IHSMAEL Afraid :   More than half the days   ISHMAEL Trouble Relaxing :   Nearly every day   ISHMAEL Total Screening Score :   16    ISHMAEL Difficulty with Work, Home, Others :   Somewhat difficult   Tati Allen - 10/18/2019 10:11 AM CDT

## 2022-07-24 ENCOUNTER — HEALTH MAINTENANCE LETTER (OUTPATIENT)
Age: 22
End: 2022-07-24

## 2022-08-31 ENCOUNTER — HOSPITAL ENCOUNTER (OUTPATIENT)
Facility: CLINIC | Age: 22
Discharge: HOME | End: 2022-08-31
Attending: FAMILY MEDICINE | Admitting: FAMILY MEDICINE
Payer: MEDICAID

## 2022-08-31 ENCOUNTER — OFFICE VISIT (OUTPATIENT)
Dept: FAMILY MEDICINE | Facility: CLINIC | Age: 22
End: 2022-08-31
Payer: COMMERCIAL

## 2022-08-31 VITALS
HEART RATE: 94 BPM | OXYGEN SATURATION: 100 % | SYSTOLIC BLOOD PRESSURE: 124 MMHG | HEIGHT: 64 IN | DIASTOLIC BLOOD PRESSURE: 72 MMHG | WEIGHT: 239.3 LBS | BODY MASS INDEX: 40.85 KG/M2

## 2022-08-31 DIAGNOSIS — E66.01 MORBID OBESITY (H): ICD-10-CM

## 2022-08-31 DIAGNOSIS — R63.5 WEIGHT GAIN: Primary | ICD-10-CM

## 2022-08-31 LAB
FSH SERPL IRP2-ACNC: 4.9 MIU/ML
TSH SERPL DL<=0.005 MIU/L-ACNC: 3.36 UIU/ML (ref 0.3–4.2)

## 2022-08-31 PROCEDURE — 99214 OFFICE O/P EST MOD 30 MIN: CPT | Performed by: FAMILY MEDICINE

## 2022-08-31 PROCEDURE — 82627 DEHYDROEPIANDROSTERONE: CPT

## 2022-08-31 PROCEDURE — 83001 ASSAY OF GONADOTROPIN (FSH): CPT

## 2022-08-31 PROCEDURE — 36415 COLL VENOUS BLD VENIPUNCTURE: CPT | Performed by: FAMILY MEDICINE

## 2022-08-31 PROCEDURE — 84443 ASSAY THYROID STIM HORMONE: CPT

## 2022-08-31 PROCEDURE — 84403 ASSAY OF TOTAL TESTOSTERONE: CPT

## 2022-08-31 PROCEDURE — 82670 ASSAY OF TOTAL ESTRADIOL: CPT | Performed by: FAMILY MEDICINE

## 2022-08-31 ASSESSMENT — ENCOUNTER SYMPTOMS
EYES NEGATIVE: 1
PSYCHIATRIC NEGATIVE: 1
MUSCULOSKELETAL NEGATIVE: 1
HEMATOLOGIC/LYMPHATIC NEGATIVE: 1
NEUROLOGICAL NEGATIVE: 1
GASTROINTESTINAL NEGATIVE: 1
ALLERGIC/IMMUNOLOGIC NEGATIVE: 1
ENDOCRINE NEGATIVE: 1
CONSTITUTIONAL NEGATIVE: 1
CARDIOVASCULAR NEGATIVE: 1
RESPIRATORY NEGATIVE: 1

## 2022-08-31 NOTE — PROGRESS NOTES
"  Assessment & Plan     Weight gain  Patient with approximately 70 pound weight gain over the past couple of years.  Some of it was likely related to her pregnancy but despite lifestyle interventions has regained 30 pounds within the past year.  Will do lab testing.  If thyroid is normal she likely has polycystic ovarian syndrome and might benefit from starting metformin.  We discussed this medication.  We will contact her with those results.  - TSH with free T4 reflex; Future  - DHEA sulfate; Future  - Testosterone total; Future  - Estradiol; Future  - Follicle stimulating hormone; Future  - TSH with free T4 reflex  - DHEA sulfate  - Testosterone total  - Estradiol  - Follicle stimulating hormone    Morbid obesity (H)  Patient has been following a low calorie diet and cutting processed foods out as well as staying active and exercising as much as she can while still caring for her 1-year-old.  Will reassess after lab results return.  May be related to hormonal imbalance               BMI:   Estimated body mass index is 41.08 kg/m  as calculated from the following:    Height as of this encounter: 1.626 m (5' 4\").    Weight as of this encounter: 108.5 kg (239 lb 4.8 oz).   Weight management plan: Discussed healthy diet and exercise guidelines.  Consider metformin if thyroid is normal.        No follow-ups on file.    Marisol Ivey MD  [facility]    Dre Dobson is a 22 year old accompanied by her self, presenting for the following health issues:  Weight Problem    Patient with significant weight gain through 2020 and 2021.  In spring 2021 sought evaluation and was found to be 34 weeks pregnant.  After delivery of her daughter last July patient lost 20 to 30 pounds but notes that the weight has all come back on.  She has always had very irregular periods.  She recently started a period but prior to that it had been several months.  She has been working at dietary changes and " exercise as well as staying active taking care of her 1-year-old but has not seen significant improvement in her weight.  She is wondering if there may be some underlying hormonal imbalance that is causing the symptoms.            Review of Systems   Constitutional: Negative.    HENT: Negative.    Eyes: Negative.    Respiratory: Negative.    Cardiovascular: Negative.    Gastrointestinal: Negative.    Endocrine: Negative.    Breasts:  negative.    Genitourinary: Negative.    Musculoskeletal: Negative.    Skin: Negative.    Allergic/Immunologic: Negative.    Neurological: Negative.    Hematological: Negative.    Psychiatric/Behavioral: Negative.             Objective    /72   Pulse 94   Wt 108.5 kg (239 lb 4.8 oz)   LMP 08/28/2022   SpO2 100%   BMI 41.08 kg/m    Body mass index is 41.08 kg/m .  Physical Exam   GENERAL: healthy, alert and no distress  EYES: Eyes grossly normal to inspection, PERRL and conjunctivae and sclerae normal  NECK: no adenopathy, no asymmetry, masses, or scars and thyroid normal to palpation                    .  ..

## 2022-09-01 LAB
DHEA-S SERPL-MCNC: 196 UG/DL (ref 35–430)
ESTRADIOL SERPL-MCNC: 25 PG/ML

## 2022-09-02 ENCOUNTER — E-VISIT (OUTPATIENT)
Dept: FAMILY MEDICINE | Facility: CLINIC | Age: 22
End: 2022-09-02
Payer: MEDICAID

## 2022-09-02 DIAGNOSIS — E28.2 PCOS (POLYCYSTIC OVARIAN SYNDROME): Primary | ICD-10-CM

## 2022-09-02 LAB — TESTOST SERPL-MCNC: 20 NG/DL (ref 8–60)

## 2022-09-02 PROCEDURE — 99207 PR NON-BILLABLE SERV PER CHARTING: CPT | Performed by: FAMILY MEDICINE

## 2022-09-02 NOTE — PATIENT INSTRUCTIONS
Thank you for choosing us for your care. I have placed an order for a prescription so that you can start treatment. Will start metformin 500mg twice a day. Monitor for upset stomach symptoms. Let me know if you would like to pursue an ultrasound.    View your full visit summary for details by clicking on the link below. Your pharmacist will able to address any questions you may have about the medication.     Since this is related to your recent visit, you will not be charged for this eVisit.

## 2022-10-03 ENCOUNTER — HEALTH MAINTENANCE LETTER (OUTPATIENT)
Age: 22
End: 2022-10-03

## 2023-08-13 ENCOUNTER — HEALTH MAINTENANCE LETTER (OUTPATIENT)
Age: 23
End: 2023-08-13

## 2023-09-20 ENCOUNTER — E-VISIT (OUTPATIENT)
Dept: FAMILY MEDICINE | Facility: CLINIC | Age: 23
End: 2023-09-20
Payer: MEDICAID

## 2023-09-20 ENCOUNTER — OFFICE VISIT (OUTPATIENT)
Dept: FAMILY MEDICINE | Facility: CLINIC | Age: 23
End: 2023-09-20
Payer: MEDICAID

## 2023-09-20 VITALS
WEIGHT: 215 LBS | HEIGHT: 64 IN | RESPIRATION RATE: 16 BRPM | OXYGEN SATURATION: 97 % | DIASTOLIC BLOOD PRESSURE: 84 MMHG | HEART RATE: 98 BPM | BODY MASS INDEX: 36.7 KG/M2 | TEMPERATURE: 98 F | SYSTOLIC BLOOD PRESSURE: 126 MMHG

## 2023-09-20 DIAGNOSIS — J06.9 VIRAL UPPER RESPIRATORY TRACT INFECTION: Primary | ICD-10-CM

## 2023-09-20 DIAGNOSIS — J06.9 UPPER RESPIRATORY TRACT INFECTION, UNSPECIFIED TYPE: Primary | ICD-10-CM

## 2023-09-20 LAB — SARS-COV-2 RNA RESP QL NAA+PROBE: NEGATIVE

## 2023-09-20 PROCEDURE — 87635 SARS-COV-2 COVID-19 AMP PRB: CPT | Performed by: PHYSICIAN ASSISTANT

## 2023-09-20 PROCEDURE — 99213 OFFICE O/P EST LOW 20 MIN: CPT | Performed by: PHYSICIAN ASSISTANT

## 2023-09-20 PROCEDURE — 99207 PR NON-BILLABLE SERV PER CHARTING: CPT | Performed by: FAMILY MEDICINE

## 2023-09-20 NOTE — PROGRESS NOTES
Assessment & Plan     Upper respiratory tract infection, unspecified type  Covid 19 test    Push fluids, rest and ibuprofen or tylenol for comfort.    RTC for persistent or worsening sx.   If positive covid 19 pt is a candidate for antiviral medication.    - Symptomatic COVID-19 Virus (Coronavirus) by PCR   [id number]    Return for Follow up with primary care provider in 1 week.    [facility] FERDINAND Dobson is a 23 year old female who presents to clinic today for the following health issues:  Chief Complaint   Patient presents with    URI     Patient having cough on/off, sneezing, head congestion, body aches and pains in upper back/shoulders, diarrhea a few times x 2 days. Denies fevers, vomiting.     History of Present Illness       Reason for visit:  Soreness shortness of breath coughing sneezing diarrhea  Symptom onset:  1-3 days ago  Symptom progression:  Staying the same    She eats 0-1 servings of fruits and vegetables daily.She consumes 1 sweetened beverage(s) daily.She exercises with enough effort to increase her heart rate 10 to 19 minutes per day.  She exercises with enough effort to increase her heart rate 5 days per week. She is missing 7 dose(s) of medications per week.      Jagdeep for 2-day history of URI symptoms including rhinorrhea nasal congestion cough.  Some mild shortness of breath with activity such as going up and down stairs.  No chest pain.  No shortness of breath at rest.  No fevers.  She does have some mild diarrhea.  She works in a  setting with infants.  She has not done a home COVID-19 test.  No known exposures at home but multiple exposures at  for miscellaneous respiratory illnesses.      Review of Systems  Constitutional, HEENT, cardiovascular, pulmonary, gi and gu systems are negative, except as otherwise noted.      Objective    /84 (BP Location: Right arm, Patient Position: Sitting, Cuff Size: Adult  "Large)   Pulse 98   Temp 98  F (36.7  C)   Resp 16   Ht 1.626 m (5' 4\")   Wt 97.5 kg (215 lb)   LMP 08/16/2023   SpO2 97%   BMI 36.90 kg/m    Physical Exam   Pt is in no acute distress and appears well  Ears patent B:  TM s intact, non-injected. All land marks easily visibile    Nasal mucosa is non-edematous, no discharge.    Pharynx: non erythematous, tonsils non hypertrophied, No exudate   Neck supple: no adenopathy  Lungs: CTA  Heart: RRR, no murmur, no thrills or heaves   Ext: no edema  Skin: no rashes                  Answers submitted by the patient for this visit:  General Questionnaire (Submitted on 9/20/2023)  Chief Complaint: Chronic problems general questions HPI Form  How many servings of fruits and vegetables do you eat daily?: 0-1  On average, how many sweetened beverages do you drink each day (Examples: soda, juice, sweet tea, etc.  Do NOT count diet or artificially sweetened beverages)?: 1  How many minutes a day do you exercise enough to make your heart beat faster?: 10 to 19  How many days a week do you exercise enough to make your heart beat faster?: 5  How many days per week do you miss taking your medication?: 7  General Concern (Submitted on 9/20/2023)  Chief Complaint: Chronic problems general questions HPI Form  What is the reason for your visit today?: soreness shortness of breath coughing sneezing diarrhea  When did your symptoms begin?: 1-3 days ago  Are your symptoms:: Staying the same    "

## 2023-09-20 NOTE — LETTER
September 20, 2023      Olya Zaldivar  [address] [address]        To Whom It May Concern:    Olya Zaldivar  was seen on 9-20-23 due to illness, thus not able to attend work and may not return until results of Covid 19 test return which is expected to take 24 hours.        Sincerely,        Jamia López PA-C

## 2023-09-20 NOTE — PATIENT INSTRUCTIONS
Dear Olya Zaldivar,    We are sorry you are not feeling well. Based on the responses you provided, it is recommended that you be seen in-person in urgent care so we can better evaluate your symptoms. Please click here to find the nearest urgent care location to you.   You will not be charged for this Visit. Thank you for trusting us with your care.    Erich Jaquez MD

## 2023-10-06 ENCOUNTER — OFFICE VISIT (OUTPATIENT)
Dept: FAMILY MEDICINE | Facility: CLINIC | Age: 23
End: 2023-10-06
Payer: MEDICAID

## 2023-10-06 VITALS
TEMPERATURE: 97.3 F | BODY MASS INDEX: 37.05 KG/M2 | OXYGEN SATURATION: 99 % | SYSTOLIC BLOOD PRESSURE: 114 MMHG | HEART RATE: 70 BPM | RESPIRATION RATE: 16 BRPM | DIASTOLIC BLOOD PRESSURE: 76 MMHG | HEIGHT: 64 IN | WEIGHT: 217 LBS

## 2023-10-06 DIAGNOSIS — Z32.01 PREGNANCY TEST POSITIVE: Primary | ICD-10-CM

## 2023-10-06 LAB — HCG UR QL: POSITIVE

## 2023-10-06 PROCEDURE — 99213 OFFICE O/P EST LOW 20 MIN: CPT

## 2023-10-06 PROCEDURE — 81025 URINE PREGNANCY TEST: CPT

## 2023-10-06 RX ORDER — PRENATAL VIT/IRON FUM/FOLIC AC 27MG-0.8MG
1 TABLET ORAL DAILY
Qty: 90 TABLET | Refills: 3 | Status: SHIPPED | OUTPATIENT
Start: 2023-10-06 | End: 2024-01-15

## 2023-10-06 NOTE — PROGRESS NOTES
"  Assessment & Plan     Pregnancy test positive  Patient had positive home pregnancy test.  Test in clinic today is also positive.  Patient last menstrual period was August 17, patient estimated to be 7 weeks and 1 day with an estimated date of delivery of May 23, 2024.  Patient is not currently taking prenatal vitamins, she has a 2-year-old.  Discussed benefits and preventative reasoning for prenatal vitamins.  Also discussed constipation with iron included in prenatal vitamin.  Patient was previously seen in [place] for delivery of her first child but would like to go somewhere else.  We discussed recommendation in [place] for obstetrician.  - HCG qualitative urine; Future  - HCG qualitative urine  - Prenatal Vit-Fe Fumarate-FA (PRENATAL MULTIVITAMIN W/IRON) 27-0.8 MG tablet; Take 1 tablet by mouth daily             BMI:   Estimated body mass index is 37.25 kg/m  as calculated from the following:    Height as of this encounter: 1.626 m (5' 4\").    Weight as of this encounter: 98.4 kg (217 lb).           BIPIN Naranjo CNP  [facility]    Dre Dobson is a 23 year old, presenting for the following health issues:  Pregnancy Test (Confirm pregnancy  )        10/6/2023     1:09 PM   Additional Questions   Roomed by NANETTE Horner       History of Present Illness       Reason for visit:  Pregnancy confirmation    She eats 2-3 servings of fruits and vegetables daily.She consumes 1 sweetened beverage(s) daily.She exercises with enough effort to increase her heart rate 10 to 19 minutes per day.  She exercises with enough effort to increase her heart rate 3 or less days per week. She is missing 2 dose(s) of medications per week.  She is not taking prescribed medications regularly due to remembering to take.                 Review of Systems   Constitutional, HEENT, cardiovascular, pulmonary, gi and gu systems are negative, except as otherwise noted.      Objective    /76 (BP " "Location: Right arm, Patient Position: Sitting, Cuff Size: Adult Large)   Pulse 70   Temp 97.3  F (36.3  C) (Oral)   Resp 16   Ht 1.626 m (5' 4\")   Wt 98.4 kg (217 lb)   LMP 08/17/2023 (Exact Date)   SpO2 99%   BMI 37.25 kg/m    Body mass index is 37.25 kg/m .  Physical Exam  HENT:      Head: Normocephalic.      Mouth/Throat:      Mouth: Mucous membranes are moist.      Pharynx: Oropharynx is clear.   Eyes:      Extraocular Movements: Extraocular movements intact.      Conjunctiva/sclera: Conjunctivae normal.   Pulmonary:      Effort: Pulmonary effort is normal.   Musculoskeletal:         General: Normal range of motion.      Cervical back: Normal range of motion.   Skin:     General: Skin is dry.      Capillary Refill: Capillary refill takes less than 2 seconds.   Neurological:      Mental Status: She is alert and oriented to person, place, and time.   Psychiatric:         Behavior: Behavior normal.         Thought Content: Thought content normal.                              "

## 2024-01-02 ENCOUNTER — TELEPHONE (OUTPATIENT)
Dept: FAMILY MEDICINE | Facility: CLINIC | Age: 24
End: 2024-01-02
Payer: MEDICAID

## 2024-01-02 NOTE — TELEPHONE ENCOUNTER
Forms/Letter Request    Type of form/letter: Work    Have you been seen for this request: No    Do we have the form/letter: Yes:     Who is the form from?      Where did/will the form come from? Patient or family brought in       When is form/letter needed by: ASAP    How would you like the form/letter returned:     Patient Notified form requests are processed in 3-5 business days:Yes    Could we send this information to you [facility] or would you prefer to receive a phone call?:   Patient would prefer a phone call   Okay to leave a detailed message?: Yes at Cell number on file:    Telephone Information:   Mobile [phone]

## 2024-01-03 NOTE — TELEPHONE ENCOUNTER
CSS called pt.   Informed her that she does have an appt with Dr. Jaquez 01/15/2024 at 1100 and that she should still be seen to have Dr. Jaquez fill out form.  Pt asking that appt be earlier in the day.  Appt r/s to 01/15/2024 at 700 with check in at 645.

## 2024-01-15 ENCOUNTER — OFFICE VISIT (OUTPATIENT)
Dept: FAMILY MEDICINE | Facility: CLINIC | Age: 24
End: 2024-01-15
Payer: MEDICAID

## 2024-01-15 VITALS
DIASTOLIC BLOOD PRESSURE: 76 MMHG | HEIGHT: 64 IN | HEART RATE: 97 BPM | WEIGHT: 210.6 LBS | BODY MASS INDEX: 35.96 KG/M2 | RESPIRATION RATE: 16 BRPM | SYSTOLIC BLOOD PRESSURE: 109 MMHG | OXYGEN SATURATION: 97 % | TEMPERATURE: 97.1 F

## 2024-01-15 DIAGNOSIS — Z02.9 ADMINISTRATIVE ENCOUNTER: ICD-10-CM

## 2024-01-15 DIAGNOSIS — Z30.41 ENCOUNTER FOR SURVEILLANCE OF CONTRACEPTIVE PILLS: ICD-10-CM

## 2024-01-15 DIAGNOSIS — F41.1 GAD (GENERALIZED ANXIETY DISORDER): Primary | ICD-10-CM

## 2024-01-15 PROCEDURE — 99213 OFFICE O/P EST LOW 20 MIN: CPT | Performed by: FAMILY MEDICINE

## 2024-01-15 RX ORDER — NORGESTIMATE AND ETHINYL ESTRADIOL 0.25-0.035
1 KIT ORAL DAILY
COMMUNITY
End: 2024-01-15

## 2024-01-15 RX ORDER — NORGESTIMATE AND ETHINYL ESTRADIOL 0.25-0.035
1 KIT ORAL DAILY
Qty: 84 TABLET | Refills: 3 | Status: SHIPPED | OUTPATIENT
Start: 2024-01-15

## 2024-01-15 NOTE — PROGRESS NOTES
"  Assessment & Plan     ISHMAEL (generalized anxiety disorder)  Medication refilled  - sertraline (ZOLOFT) 50 MG tablet; Take 1 tablet (50 mg) by mouth daily    Encounter for surveillance of contraceptive pills  Medication refilled  - norgestimate-ethinyl estradiol (ORTHO-CYCLEN) 0.25-35 MG-MCG tablet; Take 1 tablet by mouth daily    Administrative encounter  Forms have been filled out and signed             BMI:   Estimated body mass index is 36.15 kg/m  as calculated from the following:    Height as of this encounter: 1.626 m (5' 4\").    Weight as of this encounter: 95.5 kg (210 lb 9.6 oz).           Erich Jaquez MD  [facility]    Dre Dobson is a 23 year old, presenting for the following health issues:  Follow Up (Fill out form for work, refill BCP)        1/15/2024     6:58 AM   Additional Questions   Roomed by Nneka BROOKE CMA   Accompanied by Self       History of Present Illness       Reason for visit:  Staff health report and refill birth control    She eats 2-3 servings of fruits and vegetables daily.She consumes 1 sweetened beverage(s) daily.She exercises with enough effort to increase her heart rate 20 to 29 minutes per day.  She exercises with enough effort to increase her heart rate 5 days per week.   She is taking medications regularly.     Patient is here for follow-up on oral contraceptives.  She has been doing well.  She needs refill.  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  She is currently in good health.  She has no chronic health problems aside from generalized anxiety disorder which is controlled with sertraline.          Review of Systems   Constitutional, HEENT, cardiovascular, pulmonary, gi and gu systems are negative, except as otherwise noted.      Objective    /76 (BP Location: Right arm, Patient Position: Sitting, Cuff Size: Adult Large)   Pulse 97   Temp 97.1  F (36.2  C) (Tympanic)   Resp 16   Ht 1.626 m (5' 4\")   " Wt 95.5 kg (210 lb 9.6 oz)   LMP 01/05/2024 (Exact Date)   SpO2 97%   Breastfeeding Unknown   BMI 36.15 kg/m    Body mass index is 36.15 kg/m .  Physical Exam   GENERAL: healthy, alert and no distress  EYES: Eyes grossly normal to inspection, PERRL and conjunctivae and sclerae normal  HENT: ear canals and TM's normal, nose and mouth without ulcers or lesions  NECK: no adenopathy, no asymmetry, masses, or scars and thyroid normal to palpation  RESP: lungs clear to auscultation - no rales, rhonchi or wheezes  CV: regular rate and rhythm, normal S1 S2, no S3 or S4, no murmur, click or rub, no peripheral edema and peripheral pulses strong  ABDOMEN: soft, nontender, no hepatosplenomegaly, no masses and bowel sounds normal  MS: no gross musculoskeletal defects noted, no edema  PSYCH: mentation appears normal, affect normal/bright

## 2024-09-20 ENCOUNTER — OFFICE VISIT (OUTPATIENT)
Dept: FAMILY MEDICINE | Facility: CLINIC | Age: 24
End: 2024-09-20
Payer: MEDICAID

## 2024-09-20 VITALS
WEIGHT: 227.6 LBS | BODY MASS INDEX: 37.92 KG/M2 | HEART RATE: 77 BPM | DIASTOLIC BLOOD PRESSURE: 76 MMHG | OXYGEN SATURATION: 100 % | HEIGHT: 65 IN | SYSTOLIC BLOOD PRESSURE: 106 MMHG | RESPIRATION RATE: 16 BRPM | TEMPERATURE: 98.5 F

## 2024-09-20 DIAGNOSIS — F41.1 GAD (GENERALIZED ANXIETY DISORDER): ICD-10-CM

## 2024-09-20 DIAGNOSIS — Z11.3 SCREENING FOR STDS (SEXUALLY TRANSMITTED DISEASES): ICD-10-CM

## 2024-09-20 DIAGNOSIS — Z00.00 ROUTINE GENERAL MEDICAL EXAMINATION AT A HEALTH CARE FACILITY: Primary | ICD-10-CM

## 2024-09-20 PROCEDURE — 99395 PREV VISIT EST AGE 18-39: CPT

## 2024-09-20 PROCEDURE — 87591 N.GONORRHOEAE DNA AMP PROB: CPT

## 2024-09-20 PROCEDURE — 87491 CHLMYD TRACH DNA AMP PROBE: CPT

## 2024-09-20 ASSESSMENT — ANXIETY QUESTIONNAIRES
2. NOT BEING ABLE TO STOP OR CONTROL WORRYING: SEVERAL DAYS
6. BECOMING EASILY ANNOYED OR IRRITABLE: NOT AT ALL
5. BEING SO RESTLESS THAT IT IS HARD TO SIT STILL: NOT AT ALL
7. FEELING AFRAID AS IF SOMETHING AWFUL MIGHT HAPPEN: SEVERAL DAYS
3. WORRYING TOO MUCH ABOUT DIFFERENT THINGS: SEVERAL DAYS
GAD7 TOTAL SCORE: 3
1. FEELING NERVOUS, ANXIOUS, OR ON EDGE: NOT AT ALL
GAD7 TOTAL SCORE: 3

## 2024-09-20 ASSESSMENT — SOCIAL DETERMINANTS OF HEALTH (SDOH): HOW OFTEN DO YOU GET TOGETHER WITH FRIENDS OR RELATIVES?: ONCE A WEEK

## 2024-09-20 ASSESSMENT — PATIENT HEALTH QUESTIONNAIRE - PHQ9: 5. POOR APPETITE OR OVEREATING: NOT AT ALL

## 2024-09-20 ASSESSMENT — PAIN SCALES - GENERAL: PAINLEVEL: NO PAIN (0)

## 2024-09-20 NOTE — PROGRESS NOTES
"Preventive Care Visit  [facility]  Vicenta Knight PA-C, Family Medicine  Sep 20, 2024      Assessment & Plan     Routine general medical examination at a health care facility  Patient seen today for annual physical exam.  Routine health maintenance reviewed.   Vaccinations: patient declined flu, covid, and HPV  Cervical cancer screening: Patient believes she is UTD. Cannot recall where she had her last pap done, but states it was 2 years ago. Denies any abnormal paps. If patient is accurate she would be due for her next pap in 2025 and she is informed of this.  Acute and chronic concerns addressed below.   - REVIEW OF HEALTH MAINTENANCE PROTOCOL ORDERS  - PRIMARY CARE FOLLOW-UP SCHEDULING    ISHMAEL (generalized anxiety disorder)  PHQ-2 score today 0   ISHMAEL-7 score today 3  Stable  No SI/HI  Medications: Continue sertraline 50 mg once daily   Recommended mindfulness, meditation, and exercise. Sleep hygiene.   Follow up: 1 year, sooner prn.     Screening for STDs (sexually transmitted diseases)  Asymptomatic, routine  - Chlamydia trachomatis/Neisseria gonorrhoeae by PCR- VAGINAL SELF-SWAB      Patient has been advised of split billing requirements and indicates understanding: Yes      BMI  Estimated body mass index is 38.11 kg/m  as calculated from the following:    Height as of this encounter: 1.646 m (5' 4.8\").    Weight as of this encounter: 103.2 kg (227 lb 9.6 oz).   Weight management plan: Discussed healthy diet and exercise guidelines    Counseling  Appropriate preventive services were addressed with this patient via screening, questionnaire, or discussion as appropriate for fall prevention, nutrition, physical activity, Tobacco-use cessation, social engagement, weight loss and cognition.  Checklist reviewing preventive services available has been given to the patient.  Reviewed patient's diet, addressing concerns and/or questions.   She is at risk for psychosocial distress and has been " provided with information to reduce risk.       Dre Dobson is a 24 year old, presenting for the following:  Physical (Pt is not fasting. Work forms. )        9/20/2024     8:51 AM   Additional Questions   Roomed by Priti COSTA LPN         9/20/2024   Forms   Any forms needing to be completed Yes         HPI    No acute concerns.     History of anxiety: Taking sertraline 50 mg once daily. Feels symptoms are controlled. Tolerating the medication with no side effects.     Cannot remember where her last pap was done. Remembers having a pap after her daughter was born - believes this may have been 2 years ago. Reports her pap was normal.           9/20/2024   General Health   How would you rate your overall physical health? Good   Feel stress (tense, anxious, or unable to sleep) Only a little      (!) STRESS CONCERN      9/20/2024   Nutrition   Three or more servings of calcium each day? (!) I DON'T KNOW   Diet: Regular (no restrictions)   How many servings of fruit and vegetables per day? (!) 0-1   How many sweetened beverages each day? (!) 2            9/20/2024   Exercise   Days per week of moderate/strenous exercise 7 days            9/20/2024   Social Factors   Frequency of gathering with friends or relatives Once a week   Worry food won't last until get money to buy more No   Food not last or not have enough money for food? No   Do you have housing? (Housing is defined as stable permanent housing and does not include staying ouside in a car, in a tent, in an abandoned building, in an overnight shelter, or couch-surfing.) Yes   Are you worried about losing your housing? No   Lack of transportation? No   Unable to get utilities (heat,electricity)? No            9/20/2024   Dental   Dentist two times every year? (!) DECLINE            9/20/2024   TB Screening   Were you born outside of the [place]? No              Today's PHQ-2 Score:       1/15/2024     6:49 AM   PHQ-2 ( 1999 [employer])   Q1: Little interest or  "pleasure in doing things 0   Q2: Feeling down, depressed or hopeless 0   PHQ-2 Score 0   Q1: Little interest or pleasure in doing things Not at all   Q2: Feeling down, depressed or hopeless Not at all   PHQ-2 Score 0         9/20/2024     9:39 AM   ISHMAEL-7 SCORE   Total Score 3           9/20/2024   Substance Use   Alcohol more than 3/day or more than 7/wk No   Do you use any other substances recreationally? No        Social History     Tobacco Use    Smoking status: Never     Passive exposure: Never    Smokeless tobacco: Never   Vaping Use    Vaping status: Never Used             9/20/2024   One time HIV Screening   Previous HIV test? I don't know          9/20/2024   STI Screening   New sexual partner(s) since last STI/HIV test? No        History of abnormal Pap smear: No - age 21-29 PAP every 3 years recommended             9/20/2024   Contraception/Family Planning   Questions about contraception or family planning No           Reviewed and updated as needed this visit by Provider                    BP Readings from Last 3 Encounters:   09/20/24 106/76   01/15/24 109/76   10/06/23 114/76    Wt Readings from Last 3 Encounters:   09/20/24 103.2 kg (227 lb 9.6 oz)   01/15/24 95.5 kg (210 lb 9.6 oz)   10/06/23 98.4 kg (217 lb)            Review of Systems  Constitutional, neuro, ENT, endocrine, pulmonary, cardiac, gastrointestinal, genitourinary, musculoskeletal, integument and psychiatric systems are negative, except as otherwise noted.     Objective    Exam  /76 (BP Location: Left arm, Patient Position: Sitting, Cuff Size: Adult Regular)   Pulse 77   Temp 98.5  F (36.9  C) (Oral)   Resp 16   Ht 1.646 m (5' 4.8\")   Wt 103.2 kg (227 lb 9.6 oz)   LMP 08/16/2024 (Exact Date)   SpO2 100%   Breastfeeding No   BMI 38.11 kg/m     Estimated body mass index is 38.11 kg/m  as calculated from the following:    Height as of this encounter: 1.646 m (5' 4.8\").    Weight as of this encounter: 103.2 kg (227 lb 9.6 " oz).    Physical Exam  GENERAL: alert and no distress  EYES: Eyes grossly normal to inspection, conjunctivae and sclerae normal  HENT: ear canals and TM's normal, nose and mouth without ulcers or lesions  NECK: no adenopathy, no asymmetry, masses, or scars  RESP: lungs clear to auscultation - no rales, rhonchi or wheezes  CV: regular rate and rhythm, normal S1 S2, no S3 or S4, no murmur, click or rub, no peripheral edema  ABDOMEN: soft, nontender, no hepatosplenomegaly, no masses   MS: no gross musculoskeletal defects noted, no edema  SKIN: no suspicious lesions or rashes  NEURO: Normal strength and tone, mentation intact and speech normal  PSYCH: mentation appears normal, affect normal/bright    Signed Electronically by: Vicenta Knight PA-C

## 2024-09-20 NOTE — PATIENT INSTRUCTIONS
Patient Education   Preventive Care Advice   This is general advice given by our system to help you stay healthy. However, your care team may have specific advice just for you. Please talk to your care team about your preventive care needs.  Nutrition  Eat 5 or more servings of fruits and vegetables each day.  Try wheat bread, brown rice and whole grain pasta (instead of white bread, rice, and pasta).  Get enough calcium and vitamin D. Check the label on foods and aim for 100% of the RDA (recommended daily allowance).  Lifestyle  Exercise at least 150 minutes each week  (30 minutes a day, 5 days a week).  Do muscle strengthening activities 2 days a week. These help control your weight and prevent disease.  No smoking.  Wear sunscreen to prevent skin cancer.  Have a dental exam and cleaning every 6 months.  Yearly exams  See your health care team every year to talk about:  Any changes in your health.  Any medicines your care team has prescribed.  Preventive care, family planning, and ways to prevent chronic diseases.  Shots (vaccines)   HPV shots (up to age 26), if you've never had them before.  Hepatitis B shots (up to age 59), if you've never had them before.  COVID-19 shot: Get this shot when it's due.  Flu shot: Get a flu shot every year.  Tetanus shot: Get a tetanus shot every 10 years.  Pneumococcal, hepatitis A, and RSV shots: Ask your care team if you need these based on your risk.  Shingles shot (for age 50 and up)  General health tests  Diabetes screening:  Starting at age 35, Get screened for diabetes at least every 3 years.  If you are younger than age 35, ask your care team if you should be screened for diabetes.  Cholesterol test: At age 39, start having a cholesterol test every 5 years, or more often if advised.  Bone density scan (DEXA): At age 50, ask your care team if you should have this scan for osteoporosis (brittle bones).  Hepatitis C: Get tested at least once in your life.  STIs (sexually  transmitted infections)  Before age 24: Ask your care team if you should be screened for STIs.  After age 24: Get screened for STIs if you're at risk. You are at risk for STIs (including HIV) if:  You are sexually active with more than one person.  You don't use condoms every time.  You or a partner was diagnosed with a sexually transmitted infection.  If you are at risk for HIV, ask about PrEP medicine to prevent HIV.  Get tested for HIV at least once in your life, whether you are at risk for HIV or not.  Cancer screening tests  Cervical cancer screening: If you have a cervix, begin getting regular cervical cancer screening tests starting at age 21.  Breast cancer scan (mammogram): If you've ever had breasts, begin having regular mammograms starting at age 40. This is a scan to check for breast cancer.  Colon cancer screening: It is important to start screening for colon cancer at age 45.  Have a colonoscopy test every 10 years (or more often if you're at risk) Or, ask your provider about stool tests like a FIT test every year or Cologuard test every 3 years.  To learn more about your testing options, visit:   .  For help making a decision, visit:   https://bit.ly/vs40585.  Prostate cancer screening test: If you have a prostate, ask your care team if a prostate cancer screening test (PSA) at age 55 is right for you.  Lung cancer screening: If you are a current or former smoker ages 50 to 80, ask your care team if ongoing lung cancer screenings are right for you.  For informational purposes only. Not to replace the advice of your health care provider. Copyright   2023 [employer]. All rights reserved. Clinically reviewed by the [facility] Transitions Program. TRAFFIQ 219851 - REV 01/24.

## 2024-09-20 NOTE — LETTER
September 30, 2024      Olya SPEAR Zen  [address] [id number]        Dear ,    We are writing to inform you of your test results.      Olya,  Chlamydia and gonorrhea testing is negative.  Vicenta Knight PA-C    Resulted Orders   Chlamydia trachomatis/Neisseria gonorrhoeae by PCR- VAGINAL SELF-SWAB   Result Value Ref Range    Chlamydia Trachomatis Negative Negative      Comment:      Negative for C. trachomatis rRNA by transcription mediated amplification.   A negative result by transcription mediated amplification does not preclude the presence of infection because results are dependent on proper and adequate collection, absence of inhibitors and sufficient rRNA to be detected.    Neisseria gonorrhoeae Negative Negative      Comment:      Negative for N. gonorrhoeae rRNA by transcription mediated amplification. A negative result by transcription mediated amplification does not preclude the presence of C. trachomatis infection because results are dependent on proper and adequate collection, absence of inhibitors and sufficient rRNA to be detected.       If you have any questions or concerns, please call the clinic at the number listed above.       Sincerely,      Jailene Knight PA-C

## 2024-09-21 LAB
C TRACH DNA SPEC QL PROBE+SIG AMP: NEGATIVE
N GONORRHOEA DNA SPEC QL NAA+PROBE: NEGATIVE